# Patient Record
Sex: MALE | Race: WHITE
[De-identification: names, ages, dates, MRNs, and addresses within clinical notes are randomized per-mention and may not be internally consistent; named-entity substitution may affect disease eponyms.]

---

## 2018-01-08 ENCOUNTER — HOSPITAL ENCOUNTER (OUTPATIENT)
Dept: HOSPITAL 24 - RAD | Age: 48
End: 2018-01-08
Attending: INTERNAL MEDICINE
Payer: COMMERCIAL

## 2018-01-08 DIAGNOSIS — R91.8: ICD-10-CM

## 2018-01-08 DIAGNOSIS — R50.9: ICD-10-CM

## 2018-01-08 DIAGNOSIS — R06.02: Primary | ICD-10-CM

## 2018-01-08 DIAGNOSIS — R05: ICD-10-CM

## 2018-01-08 PROCEDURE — 71046 X-RAY EXAM CHEST 2 VIEWS: CPT

## 2018-01-08 NOTE — RAD
Examination: Chest x-ray.



Clinical History: Shortness of breath, fever, sarcoidosis.



Technique: PA and lateral views of the chest were obtained. 



Comparison: None available.



Findings:



The cardiac and mediastinal contours are within normal limits. The thoracic aorta is tortuous.



No pneumothorax or pleural effusion is noted. 



There is a small 8 mm nodular opacity seen overlying the right upper lung zone, superimposed on the p
osterior right 4th rib. Comparison with a prior chest x-ray, preferably older than 2 years, is recomm
ended. 



Mild degenerative changes are noted in the spine. No acute osseous abnormality is noted.



Impression:



1. There is a small 8 mm nodular opacity seen overlying the right upper lung zone, superimposed on th
e posterior right 4th rib. Comparison with a prior chest x-ray, preferably older than 2 years, is rec
ommended. If no prior chest x-ray is available for comparison, a CT of the chest is recommended for f
urther evaluation.







Reported By:Electronically Signed by GLEN LAGUERRE MD at 1/8/2018 11:12:07 AM